# Patient Record
Sex: MALE | Race: WHITE | NOT HISPANIC OR LATINO | Employment: FULL TIME | URBAN - NONMETROPOLITAN AREA
[De-identification: names, ages, dates, MRNs, and addresses within clinical notes are randomized per-mention and may not be internally consistent; named-entity substitution may affect disease eponyms.]

---

## 2020-10-29 ENCOUNTER — OFFICE VISIT (OUTPATIENT)
Dept: CARDIOLOGY | Facility: CLINIC | Age: 60
End: 2020-10-29
Payer: MEDICAID

## 2020-10-29 VITALS
HEIGHT: 68 IN | WEIGHT: 133 LBS | BODY MASS INDEX: 20.16 KG/M2 | DIASTOLIC BLOOD PRESSURE: 90 MMHG | SYSTOLIC BLOOD PRESSURE: 128 MMHG | OXYGEN SATURATION: 89 % | HEART RATE: 80 BPM

## 2020-10-29 DIAGNOSIS — I10 HYPERTENSION, UNSPECIFIED TYPE: ICD-10-CM

## 2020-10-29 DIAGNOSIS — R00.2 PALPITATIONS: ICD-10-CM

## 2020-10-29 DIAGNOSIS — Z79.899 ENCOUNTER FOR LONG-TERM (CURRENT) USE OF HIGH-RISK MEDICATION: ICD-10-CM

## 2020-10-29 DIAGNOSIS — R94.31 NONSPECIFIC ABNORMAL ELECTROCARDIOGRAM (ECG) (EKG): Primary | ICD-10-CM

## 2020-10-29 DIAGNOSIS — Z13.220 SCREENING FOR CHOLESTEROL LEVEL: ICD-10-CM

## 2020-10-29 DIAGNOSIS — R06.02 SHORTNESS OF BREATH: ICD-10-CM

## 2020-10-29 LAB — EKG IMPRESSION: NORMAL

## 2020-10-29 PROCEDURE — 93000 ELECTROCARDIOGRAM COMPLETE: CPT | Performed by: INTERNAL MEDICINE

## 2020-10-29 PROCEDURE — 99205 OFFICE O/P NEW HI 60 MIN: CPT | Performed by: INTERNAL MEDICINE

## 2020-10-29 RX ORDER — PANTOPRAZOLE SODIUM 40 MG/1
TABLET, DELAYED RELEASE ORAL
COMMUNITY
Start: 2020-10-19

## 2020-10-29 RX ORDER — ATENOLOL 100 MG/1
100 TABLET ORAL
COMMUNITY
Start: 2020-09-23

## 2020-10-29 RX ORDER — AMLODIPINE BESYLATE 10 MG/1
10 TABLET ORAL
COMMUNITY
Start: 2020-09-23

## 2020-10-29 ASSESSMENT — ENCOUNTER SYMPTOMS
ORTHOPNEA: 0
LOSS OF CONSCIOUSNESS: 0
DEPRESSION: 0
FALLS: 0
PALPITATIONS: 1
DIZZINESS: 0
PND: 0
SHORTNESS OF BREATH: 1
ABDOMINAL PAIN: 0

## 2020-10-29 NOTE — PROGRESS NOTES
Chief Complaint   Patient presents with   • HTN (Controlled)       Subjective:   Jimmy Grady is a 60 y.o. male who presents today as a referral for an abnormal EKG.    Patient was being seen by his primary care physician for management of his hypertension but he was noted to be in an irregular rhythm following which an EKG was performed that was abnormal as detailed below.    Patient works as a  and reports feeling dyspnea associated with palpitations if he goes up 40 steps especially if he is carrying something heavy.  He denies any associated chest discomfort.  His dyspnea and palpitations usually resolve within a few minutes after resting.  He denies having any symptoms at rest.    His blood pressure is almost always in the 110s systolic.    Patient reports having a 10-pack-year smoking history, quit about 18 years ago.  Prior to  he used to drink heavily as well but has not had any alcoholic beverages since.    Referring physician: ALONDRA Adkins    History reviewed. No pertinent past medical history.  History reviewed. No pertinent surgical history.  History reviewed. No pertinent family history.  Social History     Socioeconomic History   • Marital status: Single     Spouse name: Not on file   • Number of children: Not on file   • Years of education: Not on file   • Highest education level: Not on file   Occupational History   • Not on file   Social Needs   • Financial resource strain: Not on file   • Food insecurity     Worry: Not on file     Inability: Not on file   • Transportation needs     Medical: Not on file     Non-medical: Not on file   Tobacco Use   • Smoking status: Former Smoker     Packs/day: 1.00     Types: Cigarettes     Quit date: 2002     Years since quittin.6   • Smokeless tobacco: Never Used   Substance and Sexual Activity   • Alcohol use: Not Currently     Comment: stop drinking in    • Drug use: Never   • Sexual activity: Not on file   Lifestyle   • Physical  "activity     Days per week: Not on file     Minutes per session: Not on file   • Stress: Not on file   Relationships   • Social connections     Talks on phone: Not on file     Gets together: Not on file     Attends Muslim service: Not on file     Active member of club or organization: Not on file     Attends meetings of clubs or organizations: Not on file     Relationship status: Not on file   • Intimate partner violence     Fear of current or ex partner: Not on file     Emotionally abused: Not on file     Physically abused: Not on file     Forced sexual activity: Not on file   Other Topics Concern   • Not on file   Social History Narrative   • Not on file     No Known Allergies  Outpatient Encounter Medications as of 10/29/2020   Medication Sig Dispense Refill   • amLODIPine (NORVASC) 10 MG Tab Take 10 mg by mouth every day.     • atenolol (TENORMIN) 100 MG Tab Take 100 mg by mouth every day.     • pantoprazole (PROTONIX) 40 MG Tablet Delayed Response TAKE 1 TABLET BY MOUTH EVERY DAY 30 MINS BEFORE BREAKFAST     • aspirin EC (ECOTRIN) 81 MG Tablet Delayed Response Take 1 Tab by mouth every day. 30 Tab      No facility-administered encounter medications on file as of 10/29/2020.      Review of Systems   Constitutional: Negative for malaise/fatigue.   Respiratory: Positive for shortness of breath.    Cardiovascular: Positive for palpitations. Negative for chest pain, orthopnea, leg swelling and PND.   Gastrointestinal: Negative for abdominal pain.   Musculoskeletal: Negative for falls.   Neurological: Negative for dizziness and loss of consciousness.   Psychiatric/Behavioral: Negative for depression.   All other systems reviewed and are negative.       Objective:   /90 (BP Location: Right arm, Patient Position: Sitting)   Pulse 80   Ht 1.727 m (5' 8\")   Wt 60.3 kg (133 lb)   SpO2 89%   BMI 20.22 kg/m²     Physical Exam   Constitutional: He is oriented to person, place, and time. He appears " well-developed and well-nourished. No distress.   HENT:   Head: Normocephalic and atraumatic.   Eyes: Conjunctivae are normal. No scleral icterus.   Neck: Normal range of motion. Neck supple.   Cardiovascular: Normal rate, regular rhythm and normal heart sounds. Exam reveals no gallop and no friction rub.   No murmur heard.  Pulmonary/Chest: Effort normal and breath sounds normal. No respiratory distress. He has no wheezes. He has no rales.   Abdominal: Soft. He exhibits no distension. There is no abdominal tenderness.   Musculoskeletal:         General: No edema.   Neurological: He is alert and oriented to person, place, and time.   Skin: Skin is warm and dry. He is not diaphoretic.   Psychiatric: He has a normal mood and affect. His behavior is normal. Judgment and thought content normal.   Nursing note and vitals reviewed.    EKG performed today was personally reviewed and per my interpretation shows sinus rhythm/sinus arrhythmia with atrial ectopy, first-degree AV block, right axis deviation, left ventricular hypertrophy with secondary report station abnormality.  Nonspecific intraventricular conduction delay.  EKG done at the PCPs office showed similar findings.    Assessment:     1. Nonspecific abnormal electrocardiogram (ECG) (EKG)     2. Shortness of breath  CBC WITHOUT DIFFERENTIAL    EC-ECHOCARDIOGRAM COMPLETE W/O CONT    NM-CARDIAC STRESS TEST   3. Hypertension, unspecified type  EKG    Basic Metabolic Panel   4. Palpitations  FREE THYROXINE    TSH   5. Screening for cholesterol level  Lipid Profile   6. Encounter for long-term (current) use of high-risk medication       Medical Decision Making:  Today's Assessment / Status / Plan:     Patient's EKG shows left ventricular hypertrophy by voltage criteria.  Refer for echocardiogram for further evaluation.    His dyspnea could be an anginal equivalent.  He will be referred for a pharmacologic myocardial perfusion study for ischemic evaluation.  Basic labs  have been ordered today including thyroid levels given his palpitations with his dyspnea.    If his cardiac work-up is unremarkable, he will be referred for pulmonary function testing given his smoking history.    His hypertension is well controlled.  Continue atenolol and amlodipine at current dose.    Return to clinic in 3 months or earlier if needed.    Thank you for allowing me to participate in the care of this patient. Please do not hesitate to contact me with any questions.    Paloma Flanagan MD, [facility]  Cardiologist  [facility]    PLEASE NOTE: This dictation was created using voice recognition software.

## 2020-10-29 NOTE — LETTER
[facility] [place] [address],   [address]  [place] [address]  Phone: [phone]  Fax: [phone]              Jimmy Grady  1960    Encounter Date: 10/29/2020    Paloma Flanagan M.D.          PROGRESS NOTE:  Chief Complaint   Patient presents with   • HTN (Controlled)       Subjective:   Jimmy Grady is a 60 y.o. male who presents today as a referral for an abnormal EKG.    Patient was being seen by his primary care physician for management of his hypertension but he was noted to be in an irregular rhythm following which an EKG was performed that was abnormal as detailed below.    Patient works as a  and reports feeling dyspnea associated with palpitations if he goes up 40 steps especially if he is carrying something heavy.  He denies any associated chest discomfort.  His dyspnea and palpitations usually resolve within a few minutes after resting.  He denies having any symptoms at rest.    His blood pressure is almost always in the 110s systolic.    Patient reports having a 10-pack-year smoking history, quit about 18 years ago.  Prior to 2002 he used to drink heavily as well but has not had any alcoholic beverages since.    Referring physician: ALONDRA Adkins    History reviewed. No pertinent past medical history.  History reviewed. No pertinent surgical history.  History reviewed. No pertinent family history.  Social History     Socioeconomic History   • Marital status: Single     Spouse name: Not on file   • Number of children: Not on file   • Years of education: Not on file   • Highest education level: Not on file   Occupational History   • Not on file   Social Needs   • Financial resource strain: Not on file   • Food insecurity     Worry: Not on file     Inability: Not on file   • Transportation needs     Medical: Not on file     Non-medical: Not on file   Tobacco Use   • Smoking status: Former Smoker     Packs/day: 1.00      Types: Cigarettes     Quit date: 2002     Years since quittin.6   • Smokeless tobacco: Never Used   Substance and Sexual Activity   • Alcohol use: Not Currently     Comment: stop drinking in    • Drug use: Never   • Sexual activity: Not on file   Lifestyle   • Physical activity     Days per week: Not on file     Minutes per session: Not on file   • Stress: Not on file   Relationships   • Social connections     Talks on phone: Not on file     Gets together: Not on file     Attends Baptism service: Not on file     Active member of club or organization: Not on file     Attends meetings of clubs or organizations: Not on file     Relationship status: Not on file   • Intimate partner violence     Fear of current or ex partner: Not on file     Emotionally abused: Not on file     Physically abused: Not on file     Forced sexual activity: Not on file   Other Topics Concern   • Not on file   Social History Narrative   • Not on file     No Known Allergies  Outpatient Encounter Medications as of 10/29/2020   Medication Sig Dispense Refill   • amLODIPine (NORVASC) 10 MG Tab Take 10 mg by mouth every day.     • atenolol (TENORMIN) 100 MG Tab Take 100 mg by mouth every day.     • pantoprazole (PROTONIX) 40 MG Tablet Delayed Response TAKE 1 TABLET BY MOUTH EVERY DAY 30 MINS BEFORE BREAKFAST     • aspirin EC (ECOTRIN) 81 MG Tablet Delayed Response Take 1 Tab by mouth every day. 30 Tab      No facility-administered encounter medications on file as of 10/29/2020.      Review of Systems   Constitutional: Negative for malaise/fatigue.   Respiratory: Positive for shortness of breath.    Cardiovascular: Positive for palpitations. Negative for chest pain, orthopnea, leg swelling and PND.   Gastrointestinal: Negative for abdominal pain.   Musculoskeletal: Negative for falls.   Neurological: Negative for dizziness and loss of consciousness.   Psychiatric/Behavioral: Negative for depression.   All other systems reviewed  "and are negative.       Objective:   /90 (BP Location: Right arm, Patient Position: Sitting)   Pulse 80   Ht 1.727 m (5' 8\")   Wt 60.3 kg (133 lb)   SpO2 89%   BMI 20.22 kg/m²     Physical Exam   Constitutional: He is oriented to person, place, and time. He appears well-developed and well-nourished. No distress.   HENT:   Head: Normocephalic and atraumatic.   Eyes: Conjunctivae are normal. No scleral icterus.   Neck: Normal range of motion. Neck supple.   Cardiovascular: Normal rate, regular rhythm and normal heart sounds. Exam reveals no gallop and no friction rub.   No murmur heard.  Pulmonary/Chest: Effort normal and breath sounds normal. No respiratory distress. He has no wheezes. He has no rales.   Musculoskeletal:         General: No edema.   Neurological: He is alert and oriented to person, place, and time.   Skin: Skin is warm and dry. He is not diaphoretic.   Psychiatric: He has a normal mood and affect. His behavior is normal. Judgment and thought content normal.   Nursing note and vitals reviewed.    EKG performed today was personally reviewed and per my interpretation shows sinus rhythm/sinus arrhythmia with atrial ectopy, first-degree AV block, right axis deviation, left ventricular hypertrophy with secondary report station abnormality.  Nonspecific intraventricular conduction delay.  EKG done at the PCPs office showed similar findings.    Assessment:     1. Nonspecific abnormal electrocardiogram (ECG) (EKG)     2. Shortness of breath  CBC WITHOUT DIFFERENTIAL    EC-ECHOCARDIOGRAM COMPLETE W/O CONT    NM-CARDIAC STRESS TEST   3. Hypertension, unspecified type  EKG    Basic Metabolic Panel   4. Palpitations  FREE THYROXINE    TSH   5. Screening for cholesterol level  Lipid Profile   6. Encounter for long-term (current) use of high-risk medication       Medical Decision Making:  Today's Assessment / Status / Plan:     Patient's EKG shows left ventricular hypertrophy by voltage criteria.  Refer " for echocardiogram for further evaluation.    His dyspnea could be an anginal equivalent.  He will be referred for a pharmacologic myocardial perfusion study for ischemic evaluation.  Basic labs have been ordered today including thyroid levels given his palpitations with his dyspnea.    If his cardiac work-up is unremarkable, he will be referred for pulmonary function testing given his smoking history.    His hypertension is well controlled.  Continue atenolol and amlodipine at current dose.    Return to clinic in 3 months or earlier if needed.    Thank you for allowing me to participate in the care of this patient. Please do not hesitate to contact me with any questions.    Paloma Flanagan MD, [facility]  Cardiologist  [facility]    PLEASE NOTE: This dictation was created using voice recognition software.               DAYO Adkins  [address] [address]  Via Fax: [phone]